# Patient Record
Sex: FEMALE | Race: WHITE | NOT HISPANIC OR LATINO | URBAN - NONMETROPOLITAN AREA
[De-identification: names, ages, dates, MRNs, and addresses within clinical notes are randomized per-mention and may not be internally consistent; named-entity substitution may affect disease eponyms.]

---

## 2024-10-21 ENCOUNTER — TELEPHONE (OUTPATIENT)
Dept: CARDIOLOGY | Facility: CLINIC | Age: 64
End: 2024-10-21
Payer: MEDICARE

## 2024-10-21 DIAGNOSIS — R07.2 PRECORDIAL PAIN: ICD-10-CM

## 2024-10-21 DIAGNOSIS — I47.10 SUSTAINED SVT (CMS-HCC): ICD-10-CM

## 2024-10-21 NOTE — TELEPHONE ENCOUNTER
[place] recvd from Dr. Shane Rivas MD patient needs coronary CTA for EP @ [facility]. Need referral for ablation SVT@ [facility] Dr. Guzman or Dr. Blas.     Orders prepped      Task sent to  to call and arrange once order signed.

## 2024-11-01 ENCOUNTER — TELEPHONE (OUTPATIENT)
Dept: CARDIOLOGY | Facility: CLINIC | Age: 64
End: 2024-11-01
Payer: MEDICARE

## 2024-11-01 DIAGNOSIS — I47.10 SUSTAINED SVT (CMS-HCC): ICD-10-CM

## 2024-11-01 RX ORDER — ALBUTEROL SULFATE 90 UG/1
2 INHALANT RESPIRATORY (INHALATION) EVERY 4 HOURS PRN
COMMUNITY
Start: 2024-08-05

## 2024-11-01 RX ORDER — QUETIAPINE FUMARATE 100 MG/1
100 TABLET, FILM COATED ORAL NIGHTLY
COMMUNITY
Start: 2024-07-12

## 2024-11-01 RX ORDER — ATENOLOL 100 MG/1
100 TABLET ORAL ONCE
Qty: 1 TABLET | Refills: 0 | Status: SHIPPED | OUTPATIENT
Start: 2024-11-01 | End: 2024-11-01

## 2024-11-01 RX ORDER — METOPROLOL SUCCINATE 25 MG/1
25 TABLET, EXTENDED RELEASE ORAL DAILY
COMMUNITY
Start: 2024-08-06

## 2024-11-08 ENCOUNTER — HOSPITAL ENCOUNTER (OUTPATIENT)
Dept: RADIOLOGY | Facility: CLINIC | Age: 64
Discharge: HOME | End: 2024-11-08
Payer: MEDICARE

## 2024-11-08 ENCOUNTER — TELEPHONE (OUTPATIENT)
Dept: CARDIOLOGY | Facility: CLINIC | Age: 64
End: 2024-11-08
Payer: MEDICARE

## 2024-11-08 VITALS
OXYGEN SATURATION: 98 % | SYSTOLIC BLOOD PRESSURE: 114 MMHG | HEIGHT: 62 IN | BODY MASS INDEX: 23.92 KG/M2 | WEIGHT: 130 LBS | HEART RATE: 69 BPM | DIASTOLIC BLOOD PRESSURE: 57 MMHG | RESPIRATION RATE: 18 BRPM

## 2024-11-08 DIAGNOSIS — I47.10 SUSTAINED SVT (CMS-HCC): ICD-10-CM

## 2024-11-08 DIAGNOSIS — R07.2 PRECORDIAL PAIN: ICD-10-CM

## 2024-11-08 PROCEDURE — 2500000001 HC RX 250 WO HCPCS SELF ADMINISTERED DRUGS (ALT 637 FOR MEDICARE OP): Performed by: INTERNAL MEDICINE

## 2024-11-08 PROCEDURE — 2550000001 HC RX 255 CONTRASTS: Performed by: INTERNAL MEDICINE

## 2024-11-08 PROCEDURE — 75574 CT ANGIO HRT W/3D IMAGE: CPT

## 2024-11-08 PROCEDURE — 75574 CT ANGIO HRT W/3D IMAGE: CPT | Performed by: INTERNAL MEDICINE

## 2024-11-08 RX ORDER — NITROGLYCERIN 400 UG/1
2 SPRAY ORAL ONCE
Status: COMPLETED | OUTPATIENT
Start: 2024-11-08 | End: 2024-11-08

## 2024-11-08 NOTE — TELEPHONE ENCOUNTER
Result Communication    Resulted Orders   CT angio coronary art with heartflow if score >30%    Narrative    Interpreted By:  Carlos Bolden,   STUDY:  CT ANGIO CORONARY ART WITH HEARTFLOW IF SCORE >30%;  11/8/2024 1:48 pm      INDICATION:  Signs/Symptoms:SVT.      COMPARISON:  None.      ACCESSION NUMBER(S):  [id number]      ORDERING CLINICIAN:  ANOOP HAQ      TECHNIQUE:  Using multi-detector CT technology, Brianna 64-slice scanner, axial,  sequential imaging with retrospective gating was performed of the  chest following the intravenous administration of contrast material.  A low-osmolar contrast agent was used 75 ml of Omnipaque 350. Using  prospective ECG gating, CT scan of the coronary arteries was  performed without intravenous contrast. Coronary calcium scoring was  performed according to the method of Agatston.      The patient was premedicated with atenolol and 0.4 mg sublingual  nitroglycerin per protocol for heart rate control and coronary  dilation, respectively.      For optimization of anatomic evaluation, multiplanar reconstruction,  maximum intensity projections, and advanced 3-D off-line  postprocessing were performed on a dedicated stand-alone workstation  under the direct supervision of the interpreting physician.      CT Dose-Length Product (DLP):  1086.6 mGy/cm  CT Dose Reduction Employed: Yes iterative reconstruction          FINDINGS:  The left main is normal sized vessel that  bifurcates into the LAD  and circumflex.      There is no significant atherosclerotic change or stenotic disease.      LEFT ANTERIOR DESCENDING ARTERY:  The LAD is a normal size vessel that  wraps around the apex.  Proximal calcified plaque without significant stenosis. Mid vessel  noncalcified plaque without significant stenosis. LAD gives rise to 2  acute diagonal branches. D1: Normal.  D2: Normal.      There is no significant atherosclerotic change or stenotic disease.      LEFT CIRCUMFLEX ARTERY:  The LCfx  is a normal size vessel, which is  non-dominant.  Normal.  LCfx gives rise to 2 obtuse marginal branches.  OM1 normal.  OM2 normal.      There is no significant atherosclerotic change or stenotic disease.      RIGHT CORONARY ARTERY:  The RCA is a normal size vessel, which is  dominant .      It gives rise to a conus branch, cezar branch, and 1 acute marginal  branches.  In its distal segment it bifurcates into the PDA and PV  branch.      There is no significant atherosclerotic change or stenotic disease.      Right PDA normal.  Right PLV normal.      Coronary artery calcium score 13, 61st percentile for age, gender,  and race in asymptomatic patients.      CARDIAC CHAMBERS:  The cardiac chambers demonstrate normal atrioventricular and  ventriculoarterial concordance, and systemic and pulmonary venous  return.      LEFT VENTRICLE:  Normal size End diastolic volume 96 ml, 63 ml/m2      LEFT VENTRICLE MASS: 90 gm, 59 gm/m2      RIGHT VENTRICLE:  Normal size End diastolic volume 98 ml, 64 ml/m2      LEFT ATRIUM:  Normal size End systolic volume 71 ml, 46 ml/m2      RIGHT ATRIUM:  Normal size End systolic volume 75 ml, 49 ml/m2      INTERATRIAL SEPTUM:  Intact.      AORTIC VALVE:  The aortic valve is  trileaflet in morphology. No calcifications.      MITRAL VALVE:  No thickening/calcification.      THORACIC AORTA:  The visualized thoracic aorta is normal in course, caliber, and  contour.      There is no acute aortic pathology, such as dissection, intramural  hematoma, or contained rupture.      The aortic arch is not included on this examination.      PERICARDIUM:  There is no pericardial effusion of thickening.        Impression    1. Proximal left anterior descending mild coronary artery calcified  plaque without significant stenosis.  2. The remaining coronary arteries are normal free of atherosclerotic  disease or stenosis.  3. Coronary artery calcium score 13, 61st percentile for age, gender,  and race in  asymptomatic patients.      Reading Cardiologist: Dr. Carlos Bolden, Date:  11/8/2024  2:53 pm      Signed by: Carlos Bolden 11/8/2024 2:55 PM  Dictation workstation:   [id number]       4:12 PM      Results were successfully communicated with the patient and they acknowledged their understanding.

## 2024-11-12 ENCOUNTER — TELEPHONE (OUTPATIENT)
Dept: CARDIOLOGY | Facility: CLINIC | Age: 64
End: 2024-11-12
Payer: MEDICARE

## 2024-11-12 NOTE — TELEPHONE ENCOUNTER
Result Communication    Resulted Orders   CT angio coronary art with heartflow if score >30%    Addendum: 11/10/2024    Interpreted By:  Carlos Hein,   ADDENDUM:  Technical: The following is to serve as an over-read for a  contrast-enhanced cardiac CT, to evaluate the extravascular  structures.      Contiguous axial CT sections are performed from level the peg to  the upper abdomen following the bolus administration of 75 cc of  intravenous Omnipaque 350.              Findings: There is a 7 x 5 mm nodule in the left lower lobe (image  46). There are mild bilateral emphysematous changes.      There is no pathologic lymph node enlargement. There is no  pericardial or pleural effusion.      Images through the upper abdomen are unremarkable.      The visualized osseous structures are intact.              Impression: 7 x 5 mm left lower lobe nodule. Incidental Finding:  A  solid non-calcified pulmonary nodule measuring 6-8 mm .  (**-YCF-**)      Instructions:  Consider follow up non contrast chest CT at 6-12  months, then consider CT chest at 18-24 months. (Tod Regalado et  al., Guidelines for management of incidental pulmonary nodules  detected on CT images: From the Fleischner Society 2017, Radiology.  2017 Jul;284 (1):228-243.) [email or web address]      The remaining visualized extravascular structures are unremarkable.      Signed by: Carlos Hein 11/10/2024 5:40 PM      -------- ORIGINAL REPORT --------  Dictation workstation:   [id number]      Narrative    Interpreted By:  Carlos Bolden,   STUDY:  CT ANGIO CORONARY ART WITH HEARTFLOW IF SCORE >30%;  11/8/2024 1:48 pm      INDICATION:  Signs/Symptoms:SVT.      COMPARISON:  None.      ACCESSION NUMBER(S):  [id number]      ORDERING CLINICIAN:  ANOOP HAQ      TECHNIQUE:  Using multi-detector CT technology, Brianna 64-slice scanner, axial,  sequential imaging with retrospective gating was performed of the  chest following the intravenous  administration of contrast material.  A low-osmolar contrast agent was used 75 ml of Omnipaque 350. Using  prospective ECG gating, CT scan of the coronary arteries was  performed without intravenous contrast. Coronary calcium scoring was  performed according to the method of Agatston.      The patient was premedicated with atenolol and 0.4 mg sublingual  nitroglycerin per protocol for heart rate control and coronary  dilation, respectively.      For optimization of anatomic evaluation, multiplanar reconstruction,  maximum intensity projections, and advanced 3-D off-line  postprocessing were performed on a dedicated stand-alone workstation  under the direct supervision of the interpreting physician.      CT Dose-Length Product (DLP):  1086.6 mGy/cm  CT Dose Reduction Employed: Yes iterative reconstruction          FINDINGS:  The left main is normal sized vessel that  bifurcates into the LAD  and circumflex.      There is no significant atherosclerotic change or stenotic disease.      LEFT ANTERIOR DESCENDING ARTERY:  The LAD is a normal size vessel that  wraps around the apex.  Proximal calcified plaque without significant stenosis. Mid vessel  noncalcified plaque without significant stenosis. LAD gives rise to 2  acute diagonal branches. D1: Normal.  D2: Normal.      There is no significant atherosclerotic change or stenotic disease.      LEFT CIRCUMFLEX ARTERY:  The LCfx is a normal size vessel, which is  non-dominant.  Normal.  LCfx gives rise to 2 obtuse marginal branches.  OM1 normal.  OM2 normal.      There is no significant atherosclerotic change or stenotic disease.      RIGHT CORONARY ARTERY:  The RCA is a normal size vessel, which is  dominant .      It gives rise to a conus branch, cezar branch, and 1 acute marginal  branches.  In its distal segment it bifurcates into the PDA and PV  branch.      There is no significant atherosclerotic change or stenotic disease.      Right PDA normal.  Right PLV normal.       Coronary artery calcium score 13, 61st percentile for age, gender,  and race in asymptomatic patients.      CARDIAC CHAMBERS:  The cardiac chambers demonstrate normal atrioventricular and  ventriculoarterial concordance, and systemic and pulmonary venous  return.      LEFT VENTRICLE:  Normal size End diastolic volume 96 ml, 63 ml/m2      LEFT VENTRICLE MASS: 90 gm, 59 gm/m2      RIGHT VENTRICLE:  Normal size End diastolic volume 98 ml, 64 ml/m2      LEFT ATRIUM:  Normal size End systolic volume 71 ml, 46 ml/m2      RIGHT ATRIUM:  Normal size End systolic volume 75 ml, 49 ml/m2      INTERATRIAL SEPTUM:  Intact.      AORTIC VALVE:  The aortic valve is  trileaflet in morphology. No calcifications.      MITRAL VALVE:  No thickening/calcification.      THORACIC AORTA:  The visualized thoracic aorta is normal in course, caliber, and  contour.      There is no acute aortic pathology, such as dissection, intramural  hematoma, or contained rupture.      The aortic arch is not included on this examination.      PERICARDIUM:  There is no pericardial effusion of thickening.        Impression    1. Proximal left anterior descending mild coronary artery calcified  plaque without significant stenosis.  2. The remaining coronary arteries are normal free of atherosclerotic  disease or stenosis.  3. Coronary artery calcium score 13, 61st percentile for age, gender,  and race in asymptomatic patients.      Reading Cardiologist: Dr. Carlos Bolden, Date:  11/8/2024  2:53 pm      Signed by: Carlos Bolden 11/8/2024 2:55 PM  Dictation workstation:   [id number]       3:44 PM      Results were successfully communicated with the patient and they acknowledged their understanding.

## 2024-11-12 NOTE — TELEPHONE ENCOUNTER
----- Message from Shane Rivas sent at 11/11/2024  5:14 PM EST -----  Schedule CT scan of the chest without contrast in 6 months for follow-up for the nodule that was found  ----- Message -----  From: Yanet, Radiology Results In  Sent: 11/8/2024   2:57 PM EST  To: Shane Rivas MD

## 2024-11-12 NOTE — TELEPHONE ENCOUNTER
Patient reports she saw pulmonologist sravan sauer and she is following up on the scan results with her. TO Dr. Shane Rivas MD for fyi

## 2024-12-06 ENCOUNTER — APPOINTMENT (OUTPATIENT)
Dept: CARDIOLOGY | Facility: CLINIC | Age: 64
End: 2024-12-06
Payer: MEDICARE

## 2024-12-16 ENCOUNTER — APPOINTMENT (OUTPATIENT)
Dept: CARDIOLOGY | Facility: CLINIC | Age: 64
End: 2024-12-16
Payer: MEDICARE

## 2025-02-11 ENCOUNTER — APPOINTMENT (OUTPATIENT)
Dept: CARDIOLOGY | Facility: CLINIC | Age: 65
End: 2025-02-11
Payer: MEDICARE

## 2025-02-11 VITALS
HEART RATE: 80 BPM | SYSTOLIC BLOOD PRESSURE: 122 MMHG | DIASTOLIC BLOOD PRESSURE: 70 MMHG | BODY MASS INDEX: 23.6 KG/M2 | HEIGHT: 61 IN | WEIGHT: 125 LBS

## 2025-02-11 DIAGNOSIS — I47.10 PAROXYSMAL SUPRAVENTRICULAR TACHYCARDIA (CMS-HCC): ICD-10-CM

## 2025-02-11 DIAGNOSIS — I25.10 MILD CAD: ICD-10-CM

## 2025-02-11 DIAGNOSIS — E78.2 MIXED HYPERLIPIDEMIA: ICD-10-CM

## 2025-02-11 DIAGNOSIS — F17.200 CURRENT SMOKER: ICD-10-CM

## 2025-02-11 DIAGNOSIS — J44.9 CHRONIC OBSTRUCTIVE PULMONARY DISEASE, UNSPECIFIED COPD TYPE (MULTI): ICD-10-CM

## 2025-02-11 PROBLEM — E78.5 HYPERLIPIDEMIA: Status: ACTIVE | Noted: 2025-02-11

## 2025-02-11 PROCEDURE — 3008F BODY MASS INDEX DOCD: CPT | Performed by: INTERNAL MEDICINE

## 2025-02-11 PROCEDURE — 99214 OFFICE O/P EST MOD 30 MIN: CPT | Performed by: INTERNAL MEDICINE

## 2025-02-11 PROCEDURE — 1159F MED LIST DOCD IN RCRD: CPT | Performed by: INTERNAL MEDICINE

## 2025-02-11 RX ORDER — ESCITALOPRAM OXALATE 10 MG/1
10 TABLET ORAL
COMMUNITY
Start: 2024-08-05

## 2025-02-11 RX ORDER — ROSUVASTATIN CALCIUM 10 MG/1
10 TABLET, COATED ORAL DAILY
Qty: 90 TABLET | Refills: 3 | Status: SHIPPED | OUTPATIENT
Start: 2025-02-11 | End: 2026-02-11

## 2025-02-11 RX ORDER — ASPIRIN 81 MG/1
81 TABLET ORAL DAILY
Start: 2025-02-11 | End: 2026-02-11

## 2025-02-11 ASSESSMENT — ENCOUNTER SYMPTOMS: PALPITATIONS: 1

## 2025-02-11 NOTE — PROGRESS NOTES
Subjective   Maya Dutton is a 64 y.o. female       Chief Complaint    Follow-up          HPI   Patient is in the office after recent hospitalization at [facility] when I saw her for SVT.  She also had symptoms of chest pain with negative workup.  She was advised since she had noninvasive testing with nuclear stress test in 2022 with normal result that she should have coronary CT angiography with coronary calcium score which she did.  Her calcium score came back at 13 with a plaque in the LAD which was not significant.  Other coronaries were unremarkable.  She quit smoking for 3 months recently but a month ago went back to smoking.  She does have underlying COPD from long-term tobacco use along with history of asthma.  I reviewed with the patient the results of the recent cardiac investigations.  She reported no more symptoms of chest pain but since she has been back to smoking and drinking more than average coffee she has been having symptoms of palpitations.  Her examination today was only remarkable for diminished breath sounds.    Assessment/recommendations:    1-PSVT, triggered mostly by inhaler therapy that she uses for COPD and caffeine consumption.  She is on metoprolol 25 mg daily.  We discussed in the hospital and in the office today the option of the ablation as a radical treatment for SVT if this becomes recurrent and interfering with quality of life.  When she feels that she got to that point she is willing to go for the ablation.  Meanwhile advised patient to continue beta-blocker therapy and avoid caffeine and tobacco.  2-elevated coronary calcium score at 13 with plaque in the LAD.  The patient had normal functional studies in the past and has had no recurrent angina pectoris.  Discussed at length with the patient the need for tobacco cessation and she will be started taking baby aspirin and I suggest he go on rosuvastatin 10 mg daily.  3-COPD from heavy tobacco use.  Advised patient against  "tobacco and to continue to use rescue inhaler.  4-hyperlipidemia, rosuvastatin was initiated.  5-tobacco abuse, was counseled extensively on need for tobacco cessation  Review of Systems   Cardiovascular:  Positive for palpitations.   All other systems reviewed and are negative.           Vitals:    02/11/25 0919   BP: 122/70   BP Location: Left arm   Patient Position: Sitting   Pulse: 80   Weight: 56.7 kg (125 lb)   Height: 1.549 m (5' 1\")        Objective   Physical Exam  Constitutional:       Appearance: Normal appearance.   HENT:      Nose: Nose normal.   Neck:      Vascular: No carotid bruit.   Cardiovascular:      Rate and Rhythm: Normal rate.      Pulses: Normal pulses.      Heart sounds: Normal heart sounds.   Pulmonary:      Effort: Pulmonary effort is normal.      Breath sounds: Decreased air movement present.   Abdominal:      General: Bowel sounds are normal.      Palpations: Abdomen is soft.   Musculoskeletal:         General: Normal range of motion.      Cervical back: Normal range of motion.      Right lower leg: No edema.      Left lower leg: No edema.   Skin:     General: Skin is warm and dry.   Neurological:      General: No focal deficit present.      Mental Status: She is alert.   Psychiatric:         Mood and Affect: Mood normal.         Behavior: Behavior normal.         Thought Content: Thought content normal.         Judgment: Judgment normal.         Allergies  Patient has no known allergies.     Current Medications    Current Outpatient Medications:     albuterol 90 mcg/actuation inhaler, Inhale 2 puffs every 4 hours if needed for wheezing or shortness of breath., Disp: , Rfl:     budesonide-glycopyr-formoterol (BREZTRI) 160-9-4.8 mcg/actuation HFA aerosol inhaler, Inhale 2 puffs 2 times a day., Disp: , Rfl:     escitalopram (Lexapro) 10 mg tablet, Take 1 tablet (10 mg) by mouth once daily in the morning. Take before meals., Disp: , Rfl:     metoprolol succinate XL (Toprol-XL) 25 mg 24 hr " tablet, Take 1 tablet (25 mg) by mouth once daily., Disp: , Rfl:     QUEtiapine (SEROquel) 100 mg tablet, Take 1 tablet (100 mg) by mouth once daily at bedtime., Disp: , Rfl:     aspirin 81 mg EC tablet, Take 1 tablet (81 mg) by mouth once daily., Disp: , Rfl:     rosuvastatin (Crestor) 10 mg tablet, Take 1 tablet (10 mg) by mouth once daily., Disp: 90 tablet, Rfl: 3                     Assessment/Plan   1. Paroxysmal supraventricular tachycardia (CMS-HCC)  Follow Up In Cardiology    aspirin 81 mg EC tablet    Follow Up In Cardiology      2. Mild CAD        3. Mixed hyperlipidemia  rosuvastatin (Crestor) 10 mg tablet    Lipid Panel    Alanine Aminotransferase    Aspartate Aminotransferase    Lipid Panel    Alanine Aminotransferase    Aspartate Aminotransferase      4. Chronic obstructive pulmonary disease, unspecified COPD type (Multi)        5. Current smoker        6. BMI 23.0-23.9, adult                 Scribe Attestation  By signing my name below, Lucia VALENTIN LPN, Scribe   attest that this documentation has been prepared under the direction and in the presence of Shane Rivas MD.     Provider Attestation - Scribe documentation    All medical record entries made by the Scribe were at my direction and personally dictated by me. I have reviewed the chart and agree that the record accurately reflects my personal performance of the history, physical exam, discussion and plan.

## 2025-02-11 NOTE — PATIENT INSTRUCTIONS
Please bring all medicines, vitamins, and herbal supplements with you when you come to the office.    Prescriptions will not be filled unless you are compliant with your follow up appointments or have a follow up appointment scheduled as per instruction of your physician. Refills should be requested at the time of your visit.     Aspirin 81 mg daily  Crestor daily  Lab work 3 months

## 2025-11-13 ENCOUNTER — APPOINTMENT (OUTPATIENT)
Dept: CARDIOLOGY | Facility: CLINIC | Age: 65
End: 2025-11-13
Payer: MEDICARE